# Patient Record
Sex: MALE | Race: WHITE | NOT HISPANIC OR LATINO | URBAN - METROPOLITAN AREA
[De-identification: names, ages, dates, MRNs, and addresses within clinical notes are randomized per-mention and may not be internally consistent; named-entity substitution may affect disease eponyms.]

---

## 2024-01-26 ENCOUNTER — OFFICE VISIT (OUTPATIENT)
Dept: HEPATOLOGY | Facility: CLINIC | Age: 49
End: 2024-01-26
Payer: COMMERCIAL

## 2024-01-26 VITALS — BODY MASS INDEX: 32.62 KG/M2 | HEIGHT: 71 IN | WEIGHT: 233 LBS

## 2024-01-26 DIAGNOSIS — I10 ESSENTIAL HYPERTENSION: ICD-10-CM

## 2024-01-26 DIAGNOSIS — E66.09 CLASS 1 OBESITY DUE TO EXCESS CALORIES WITH SERIOUS COMORBIDITY AND BODY MASS INDEX (BMI) OF 32.0 TO 32.9 IN ADULT: ICD-10-CM

## 2024-01-26 DIAGNOSIS — L29.9 ITCHING: ICD-10-CM

## 2024-01-26 DIAGNOSIS — E78.2 MIXED HYPERLIPIDEMIA: ICD-10-CM

## 2024-01-26 DIAGNOSIS — K76.0 FATTY LIVER: Primary | ICD-10-CM

## 2024-01-26 DIAGNOSIS — E11.9 TYPE 2 DIABETES MELLITUS WITHOUT COMPLICATION, WITHOUT LONG-TERM CURRENT USE OF INSULIN: ICD-10-CM

## 2024-01-26 PROBLEM — M21.6X9 ACQUIRED CAVUS DEFORMITY OF FOOT: Status: ACTIVE | Noted: 2024-01-26

## 2024-01-26 PROBLEM — M25.569 PAIN IN JOINT, LOWER LEG: Status: ACTIVE | Noted: 2024-01-26

## 2024-01-26 PROBLEM — N20.0 RECURRENT NEPHROLITHIASIS: Status: ACTIVE | Noted: 2022-03-24

## 2024-01-26 PROCEDURE — 3008F BODY MASS INDEX DOCD: CPT | Mod: CPTII,95,, | Performed by: NURSE PRACTITIONER

## 2024-01-26 PROCEDURE — 1160F RVW MEDS BY RX/DR IN RCRD: CPT | Mod: CPTII,95,, | Performed by: NURSE PRACTITIONER

## 2024-01-26 PROCEDURE — 99203 OFFICE O/P NEW LOW 30 MIN: CPT | Mod: 95,,, | Performed by: NURSE PRACTITIONER

## 2024-01-26 PROCEDURE — 1159F MED LIST DOCD IN RCRD: CPT | Mod: CPTII,95,, | Performed by: NURSE PRACTITIONER

## 2024-01-26 NOTE — PROGRESS NOTES
"The patient location is: [place]  The chief complaint leading to consultation is: see below    Visit type: audiovisual    Face to Face time with patient: 30 minutes of total time spent on the encounter, which includes face to face time and non-face to face time preparing to see the patient (eg, review of tests), Obtaining and/or reviewing separately obtained history, Documenting clinical information in the electronic or other health record, Independently interpreting results (not separately reported) and communicating results to the patient/family/caregiver, or Care coordination (not separately reported).       Each patient to whom he or she provides medical services by telemedicine is:  (1) informed of the relationship between the physician and patient and the respective role of any other health care provider with respect to management of the patient; and (2) notified that he or she may decline to receive medical services by telemedicine and may withdraw from such care at any time.    Notes:    [facility] VISIT NEW PT NOTE    PCP: No, Primary Doctor     CHIEF COMPLAINT: fatty liver, elevated liver enzymes, itching     HPI: This is a 48 y.o. male with PMH noted below, presenting for evaluation of above    Previous serologic w/u - will obtain     Prior serologic workup:   No results found for: "SMOOTHMUSCAB", "AMAIFA", "IGGSERUM", "ANASCREEN", "FERRITIN", "FESATURATED", "PETH", "[id number]", "CERULOPLSM", "HEPBSAG", "HEPCAB", "HEPAIGM"      Risk factors for fatty liver include obesity, HTN, HLD, DM    Liver fibrosis staging:  -- fibroscan before RTC    Interval HPI: Presents today alone before video visit. On Ozempic for diabetes. Highest wt   249 lbs, currently 233 lbs, goal wt 205 lbs   Does having itching that is unclear the cause, is worried it could be r/t fatty liver     Labs done 9/2023 (outside labs) show mildly elevated/near normal transaminase levels (intermittently elevated in the past)    No " "results found for: "ALT", "AST", "ALKPHOS", "BILITOT", "ALBUMIN", "INR", "PLT"    Abd CT done 2022 showed fatty liver. No splenomegaly - thinks he may have had an US with an outside GI in the past year but unsure of provider's name     Denies family history of liver disease . Denies Alcohol consumption, see below  Social History     Substance and Sexual Activity   Alcohol Use Not Currently       Immunity to Hep A and B - will check with next labs          Allergy and medication list reviewed and updated     PMHX:  has no past medical history on file.    PSHX:  has no past surgical history on file.    FAMILY HISTORY: Updated and reviewed in EPIC    ROS:   GENERAL: Denies fatigue  CARDIOVASCULAR: Denies edema  GI: Denies abdominal pain  SKIN: Denies rash, + itching   NEURO: Denies confusion, memory loss, or mood changes    PHYSICAL EXAM:   Friendly White male, in no acute distress; alert and oriented to person, place and time  VITALS: Ht 5' 11" (1.803 m)   Wt 105.7 kg (233 lb)   BMI 32.50 kg/m²   EYES: Sclerae anicteric  GI: Soft, non-tender, non-distended. No ascites.  EXTREMITIES:  No edema.  SKIN: Warm and dry. No jaundice. No telangectasias noted. No palmar erythema.  NEURO:  No asterixis.  PSYCH:  Thought and speech pattern appropriate. Behavior normal      EDUCATION:  See instructions discussed with patient in Instructions section of the After Visit Summary     ASSESSMENT & PLAN:  48 y.o. male with:  1.  Fatty liver, likely related to metabolic risk factors   - see HPI  -- Immunity to Hep A and B : see HPI  -- Fibroscan with RTC  -- Recommendations discussed with patient:  Limit alcohol consumption  2 Weight loss goal of 30 lbs  3. Low carb/sugar, high fiber and protein diet, limit your carb intake to LESS than 30-45 grams of carbs with a meal or LESS than 5-10 grams with any snack   4. Exercise, 5 days per week, 30 minutes per day, as tolerated  5. Recommend good cholesterol, blood pressure, blood sugar " levels     2. Itching  -- unlikely to be due to fatty liver unless has advanced fibrosis or elevated alk phos or bili, will do labs with RTC  -- AMA to test for PBC but previous alk phos WNL    3. Elevated liver enzymes in the past  -- labs before return to clinic   Orders Placed This Encounter   Procedures    FibroScan [place] (Vibration Controlled Transient Elastography)    Hepatitis Panel, Acute    Hepatic Function Panel    Alpha-1-Antitrypsin    Antimitochondrial Antibody    Ceruloplasmin    Iron and TIBC    IgM    Hepatitis B Surface Ab, Qualitative    Hepatitis A antibody, IgG    Hepatitis B Core Antibody, Total        4. Obesity, HTN, HLD, DM   -- Body mass index is 32.5 kg/m².   -- increases risk for fatty liver             Follow up in about 2 months (around 3/26/2024). with lab and fibroscan before  Orders Placed This Encounter   Procedures    FibroScan [place] (Vibration Controlled Transient Elastography)    Hepatitis Panel, Acute    Hepatic Function Panel    Alpha-1-Antitrypsin    Antimitochondrial Antibody    Ceruloplasmin    Iron and TIBC    IgM    Hepatitis B Surface Ab, Qualitative    Hepatitis A antibody, IgG    Hepatitis B Core Antibody, Total        Thank you for allowing me to participate in the care of DALY Sullivan    I spent a total of 30 minutes on the day of the visit.This includes face to face time and non-face to face time preparing to see the patient (eg, review of tests), obtaining and/or reviewing separately obtained history, documenting clinical information in the electronic or other health record, independently interpreting results and communicating results to the patient/family/caregiver, and coordinating care.         CC'ed note to:

## 2024-01-26 NOTE — PATIENT INSTRUCTIONS
1. Fibroscan to look for fat or scar tissue in the liver with return to clinic   2. Will check immunity markers for Hepatitis A and B and arrange for vaccination if needed  3. Labs before return to clinic to  check for multiple causes of liver disease. These labs will release to you as soon as they are resulted but we will discuss them in detail at your upcoming visit to discuss what the lab results mean.   4.  Follow up in 2 months after labs and fibroscan    There is no FDA approved therapy for fatty liver disease. Therefore, these things are important:  Limit alcohol consumption  Weight loss goal of 30 lbs. [facility] offers benefits to patients so let me know if you want a referral to the [facility] gym. Also, [facility] has dieticians that can create a weight loss plan. If you are interested let me know and I can place a referral. If so, contact the dietician team at [facility] at email [email or web address] or call [phone] to get scheduled. They do offer video visits   Low carb/sugar and high protein diet (~1 g/kg/day of protein).Try to limit your carb intake to LESS than 30-45 grams of carbs with a meal or LESS than 5-10 grams with any snack (total of any snack foods eaten during that time). Use [employer] Pal or Lose It raquel to add up your carbs through the day. Do NOT drink any beverages with calories or carbs (this can lead to high blood sugar and weight gain). The main thing to focus on is low calorie, high protein, low carb)  Exercise, 5 days per week, 30 minutes per day, as tolerated  Recommend good cholesterol, blood pressure, blood sugar levels     In some people, fatty liver can progress to steatohepatitis (inflamatory fatty liver) and possibly to cirrhosis, putting one at increased risk for liver cancer, liver failure, and death. Therefore, the lifestyle changes are very important to decrease this risk.

## 2024-03-28 ENCOUNTER — LAB VISIT (OUTPATIENT)
Dept: LAB | Facility: HOSPITAL | Age: 49
End: 2024-03-28
Payer: COMMERCIAL

## 2024-03-28 ENCOUNTER — PROCEDURE VISIT (OUTPATIENT)
Dept: HEPATOLOGY | Facility: CLINIC | Age: 49
End: 2024-03-28
Payer: COMMERCIAL

## 2024-03-28 DIAGNOSIS — L29.9 ITCHING: ICD-10-CM

## 2024-03-28 DIAGNOSIS — K76.0 FATTY LIVER: ICD-10-CM

## 2024-03-28 LAB
A1AT SERPL-MCNC: 165 MG/DL (ref 100–190)
ALBUMIN SERPL BCP-MCNC: 4.1 G/DL (ref 3.5–5.2)
ALP SERPL-CCNC: 87 U/L (ref 55–135)
ALT SERPL W/O P-5'-P-CCNC: 36 U/L (ref 10–44)
AST SERPL-CCNC: 28 U/L (ref 10–40)
BILIRUB DIRECT SERPL-MCNC: 0.3 MG/DL (ref 0.1–0.3)
BILIRUB SERPL-MCNC: 0.8 MG/DL (ref 0.1–1)
CERULOPLASMIN SERPL-MCNC: 28 MG/DL (ref 15–45)
HAV IGG SER QL IA: REACTIVE
HAV IGM SERPL QL IA: NORMAL
HBV CORE AB SERPL QL IA: NORMAL
HBV CORE IGM SERPL QL IA: NORMAL
HBV SURFACE AB SER-ACNC: <3 MIU/ML
HBV SURFACE AB SER-ACNC: NORMAL M[IU]/ML
HBV SURFACE AG SERPL QL IA: NORMAL
HCV AB SERPL QL IA: NORMAL
IGM SERPL-MCNC: 93 MG/DL (ref 50–300)
IRON SERPL-MCNC: 111 UG/DL (ref 45–160)
PROT SERPL-MCNC: 7 G/DL (ref 6–8.4)
SATURATED IRON: 27 % (ref 20–50)
TOTAL IRON BINDING CAPACITY: 407 UG/DL (ref 250–450)
TRANSFERRIN SERPL-MCNC: 275 MG/DL (ref 200–375)

## 2024-03-28 PROCEDURE — 86704 HEP B CORE ANTIBODY TOTAL: CPT | Performed by: NURSE PRACTITIONER

## 2024-03-28 PROCEDURE — 82784 ASSAY IGA/IGD/IGG/IGM EACH: CPT | Performed by: NURSE PRACTITIONER

## 2024-03-28 PROCEDURE — 86381 MITOCHONDRIAL ANTIBODY EACH: CPT | Performed by: NURSE PRACTITIONER

## 2024-03-28 PROCEDURE — 82103 ALPHA-1-ANTITRYPSIN TOTAL: CPT | Performed by: NURSE PRACTITIONER

## 2024-03-28 PROCEDURE — 83540 ASSAY OF IRON: CPT | Performed by: NURSE PRACTITIONER

## 2024-03-28 PROCEDURE — 36415 COLL VENOUS BLD VENIPUNCTURE: CPT | Performed by: NURSE PRACTITIONER

## 2024-03-28 PROCEDURE — 82390 ASSAY OF CERULOPLASMIN: CPT | Performed by: NURSE PRACTITIONER

## 2024-03-28 PROCEDURE — 80076 HEPATIC FUNCTION PANEL: CPT | Performed by: NURSE PRACTITIONER

## 2024-03-28 PROCEDURE — 91200 LIVER ELASTOGRAPHY: CPT | Mod: S$GLB,,, | Performed by: NURSE PRACTITIONER

## 2024-03-28 PROCEDURE — 86790 VIRUS ANTIBODY NOS: CPT | Performed by: NURSE PRACTITIONER

## 2024-03-28 PROCEDURE — 80074 ACUTE HEPATITIS PANEL: CPT | Performed by: NURSE PRACTITIONER

## 2024-03-28 PROCEDURE — 86706 HEP B SURFACE ANTIBODY: CPT | Mod: 91 | Performed by: NURSE PRACTITIONER

## 2024-03-28 NOTE — PROCEDURES
FibroScan [place] (Vibration Controlled Transient Elastography)    Date/Time: 3/28/2024 9:15 AM    Performed by: Vonda Acosta NP  Authorized by: Vonda Acosta NP    Probe:  XL    Universal Protocol: Patient's identity, procedure and site were verified, confirmatory pause was performed.  Discussed procedure including risks and potential complications.  Questions answered.  Patient verbalizes understanding and wishes to proceed with VCTE.     Procedure: After providing explanations of the procedure, patient was placed in the supine position with right arm in maximum abduction to allow optimal exposure of right lateral abdomen.  Patient was briefly assessed, Testing was performed in the mid-axillary location, 50Hz Shear Wave pulses were applied and the resulting Shear Wave and Propagation Speed detected with a 3.5 MHz ultrasonic signal, using the FibroScan probe, Skin to liver capsule distance and liver parenchyma were accessed during the entire examination with the FibroScan probe, Patient was instructed to breathe normally and to abstain from sudden movements during the procedure, allowing for random measurements of liver stiffness. At least 10 Shear Waves were produced, Individual measurements of each Shear Wave were calculated.  Patient tolerated the procedure well with no complications.  Meets discharge criteria as was dismissed.  Rates pain 0 out of 10.  Patient will follow up with ordering provider to review results.    Findings  Median liver stiffness score:  4.9  CAP Reading: dB/m:  226    IQR/med %:  14  Interpretation  Fibrosis interpretation is based on medial liver stiffness - Kilopascal (kPa).    Fibrosis Stage:  F 0-1  Steatosis interpretation is based on controlled attenuation parameter - (dB/m).    Steatosis Grade:  <S1

## 2024-04-01 LAB — MITOCHONDRIA AB TITR SER IF: NORMAL {TITER}

## 2024-04-05 ENCOUNTER — PATIENT MESSAGE (OUTPATIENT)
Dept: HEPATOLOGY | Facility: CLINIC | Age: 49
End: 2024-04-05

## 2024-04-05 ENCOUNTER — OFFICE VISIT (OUTPATIENT)
Dept: HEPATOLOGY | Facility: CLINIC | Age: 49
End: 2024-04-05
Payer: COMMERCIAL

## 2024-04-05 VITALS — HEIGHT: 71 IN | BODY MASS INDEX: 29.96 KG/M2 | WEIGHT: 214 LBS

## 2024-04-05 DIAGNOSIS — Z23 NEED FOR HEPATITIS B VACCINATION: ICD-10-CM

## 2024-04-05 DIAGNOSIS — Z87.19 HISTORY OF FATTY INFILTRATION OF LIVER: Primary | ICD-10-CM

## 2024-04-05 PROBLEM — K76.0 FATTY LIVER: Status: RESOLVED | Noted: 2024-01-26 | Resolved: 2024-04-05

## 2024-04-05 PROBLEM — E66.09 CLASS 1 OBESITY DUE TO EXCESS CALORIES WITH SERIOUS COMORBIDITY AND BODY MASS INDEX (BMI) OF 32.0 TO 32.9 IN ADULT: Status: RESOLVED | Noted: 2024-01-26 | Resolved: 2024-04-05

## 2024-04-05 PROCEDURE — 1159F MED LIST DOCD IN RCRD: CPT | Mod: CPTII,95,, | Performed by: NURSE PRACTITIONER

## 2024-04-05 PROCEDURE — 99214 OFFICE O/P EST MOD 30 MIN: CPT | Mod: 95,,, | Performed by: NURSE PRACTITIONER

## 2024-04-05 PROCEDURE — 1160F RVW MEDS BY RX/DR IN RCRD: CPT | Mod: CPTII,95,, | Performed by: NURSE PRACTITIONER

## 2024-04-05 PROCEDURE — 3008F BODY MASS INDEX DOCD: CPT | Mod: CPTII,95,, | Performed by: NURSE PRACTITIONER

## 2024-04-05 RX ORDER — ATORVASTATIN CALCIUM 80 MG/1
80 TABLET, FILM COATED ORAL DAILY
COMMUNITY

## 2024-04-05 RX ORDER — FUROSEMIDE 20 MG/1
20 TABLET ORAL DAILY
COMMUNITY

## 2024-04-05 RX ORDER — AZELASTINE 1 MG/ML
1 SPRAY, METERED NASAL 2 TIMES DAILY
COMMUNITY
Start: 2024-03-18 | End: 2025-03-18

## 2024-04-05 RX ORDER — HYDRALAZINE HYDROCHLORIDE 100 MG/1
1 TABLET, FILM COATED ORAL 3 TIMES DAILY
COMMUNITY
Start: 2023-09-22

## 2024-04-05 RX ORDER — FLUOXETINE HYDROCHLORIDE 40 MG/1
1 CAPSULE ORAL DAILY
COMMUNITY
Start: 2024-01-09

## 2024-04-05 RX ORDER — EPINEPHRINE 0.3 MG/.3ML
0.3 INJECTION SUBCUTANEOUS ONCE
COMMUNITY
Start: 2024-03-18

## 2024-04-05 RX ORDER — CARVEDILOL 25 MG/1
25 TABLET ORAL 2 TIMES DAILY
COMMUNITY

## 2024-04-05 RX ORDER — CETIRIZINE HYDROCHLORIDE 10 MG/1
1 TABLET ORAL DAILY
COMMUNITY
Start: 2024-03-18 | End: 2025-03-18

## 2024-04-05 RX ORDER — HEPATITIS B VACCINE (RECOMBINANT) ADJUVANTED 20 UG/.5ML
0.5 INJECTION, SOLUTION INTRAMUSCULAR ONCE
Qty: 0.5 ML | Refills: 1 | Status: SHIPPED | OUTPATIENT
Start: 2024-04-05 | End: 2024-04-06

## 2024-04-05 NOTE — LETTER
April 5, 2024    Garrett Gonzales  [address] [place] [address]             Hepatology Clinic - [facility]  [address]  [place] [address]  Phone: [phone] To Whom it May Concern,    Mr. Gonzales has been evaluated by [facility] for a history of fatty liver. With significant lifestyle and diet changes, he has been successful with significant weight loss and has reversed his fatty liver. On his recent fibroscan, he no longer has fatty liver.       If you have any questions or concerns, please don't hesitate to call.    Sincerely,          Vonda Acosta, NP

## 2024-04-05 NOTE — PATIENT INSTRUCTIONS
1. Fibroscan to look for fat or scar tissue in the liver showed no significant fatty liver or fibrosis - all resolved with weight loss   2. Recommend vaccines for Hepatitis  B, see below   3. Follow up in 2 years for repeat fibroscan if you wish    These things are important to prevent fatty liver:  Limit alcohol consumption  2  Maintain weight loss   3. Low carb/sugar and high protein diet (~1 g/kg/day of protein).Try to limit your carb intake to LESS than 30-45 grams of carbs with a meal or LESS than 5-10 grams with any snack (total of any snack foods eaten during that time). Use MyFitness Pal or Lose It raquel to add up your carbs through the day. Do NOT drink any beverages with calories or carbs (this can lead to high blood sugar and weight gain). The main thing to focus on is low calorie, high protein, low carb)  4. Exercise, 5 days per week, 30 minutes per day, as tolerated  5. Recommend good cholesterol, blood pressure, blood sugar levels       In some people, fatty liver can progress to steatohepatitis (inflamatory fatty liver) and possibly to cirrhosis, increasing the risk for liver cancer, liver failure, and death. Therefore, the lifestyle changes are very important to decrease this risk.       HEP B VACCINE  Your labs show that you DO have immunity against Hep A (+ Hep A IgG), so no further vaccine needed for Hep A    Your immunity markers show that you do NOT have immunity against Hepatitis B, so I recommend that you receive the Hepatitis B vaccine. This will protect your liver from the virus, which can make your liver very sick. The vaccine series is either 2 or 3 vaccines (whichever your insurance covers), either:  1. One now, one at 4 weeks (completed after this)  OR  2.  One now, one at 4 weeks and one 6 months after the 1st one.    I sent the vaccine to the [facility]. I recommend calling them in a couple of days to see if the vaccine is covered by your insurance and arrange the vaccines  if they are covered. Their number is [phone]       If the vaccine is not covered at the pharmacy level, I sent an order that you can get the vaccine in the infectious disease department at [facility]. If that is the case, please call [phone]  to schedule your vaccine administration appointment in the infectious disease department.  If you need to proceed with vaccines with the infectious disease department, you can call to obtain a cost for these vaccines before you proceed, you can call the [facility] office at [phone] or [phone]

## 2024-04-05 NOTE — PROGRESS NOTES
The patient location is: [place]  The chief complaint leading to consultation is: see below    Visit type: audiovisual    Face to Face time with patient: 30 minutes of total time spent on the encounter, which includes face to face time and non-face to face time preparing to see the patient (eg, review of tests), Obtaining and/or reviewing separately obtained history, Documenting clinical information in the electronic or other health record, Independently interpreting results (not separately reported) and communicating results to the patient/family/caregiver, or Care coordination (not separately reported).       Each patient to whom he or she provides medical services by telemedicine is:  (1) informed of the relationship between the physician and patient and the respective role of any other health care provider with respect to management of the patient; and (2) notified that he or she may decline to receive medical services by telemedicine and may withdraw from such care at any time.    Notes:    [facility] VISIT FOLLOW UP  NOTE    PCP: No, Primary Doctor     CHIEF COMPLAINT: fatty liver, elevated liver enzymes    HPI: This is a 48 y.o. male with PMH noted below, presenting for follow up of above    Serological workup was negative for Suraj's, alpha-1 antitrypsin deficiency, hemochromatosis, and viral hepatitis      Prior serologic workup:   Lab Results   Component Value Date    AMAIFA Negative 1:40 03/28/2024    FESATURATED 27 03/28/2024    CERULOPLSM 28.0 03/28/2024    HEPBSAG Non-reactive 03/28/2024    HEPCAB Non-reactive 03/28/2024    HEPAIGM Non-reactive 03/28/2024         Risk factors for fatty liver include obesity, HTN, HLD, DM    Liver fibrosis staging:  -- Fibroscan 3/2024 noted F0, S0 (kPA 4.9, )    Interval HPI: Presents today alone before video visit. On Ozempic 2 mg for diabetes. Highest wt   249 lbs, currently 214 lbs, goal wt 205 lbs   Does having itching that is unclear the cause,  "is worried it could be r/t fatty liver     Labs done 9/2023 (outside labs) show mildly elevated/near normal transaminase levels (intermittently elevated in the past)    Lab Results   Component Value Date    ALT 36 03/28/2024    AST 28 03/28/2024    ALKPHOS 87 03/28/2024    BILITOT 0.8 03/28/2024    ALBUMIN 4.1 03/28/2024       Abd CT done 2022 showed fatty liver. No splenomegaly - thinks he may have had an US with an outside GI in the past year but unsure of provider's name     Denies family history of liver disease . Denies Alcohol consumption, see below  Social History     Substance and Sexual Activity   Alcohol Use Not Currently       +Immunity to Hep A - needs Hep B, [facility]          Allergy and medication list reviewed and updated     PMHX:  has no past medical history on file.    PSHX:  has no past surgical history on file.    FAMILY HISTORY: Updated and reviewed in EPIC    ROS:   GENERAL: Denies fatigue  CARDIOVASCULAR: Denies edema  GI: Denies abdominal pain  SKIN: Denies rash, + itching   NEURO: Denies confusion, memory loss, or mood changes    PHYSICAL EXAM:   Friendly White male, in no acute distress; alert and oriented to person, place and time  VITALS: Ht 5' 11" (1.803 m)   Wt 97.1 kg (214 lb)   BMI 29.85 kg/m²   EYES: Sclerae anicteric  GI: Soft, non-tender, non-distended. No ascites.  EXTREMITIES:  No edema.  SKIN: Warm and dry. No jaundice. No telangectasias noted. No palmar erythema.  NEURO:  No asterixis.  PSYCH:  Thought and speech pattern appropriate. Behavior normal      EDUCATION:  See instructions discussed with patient in Instructions section of the After Visit Summary     ASSESSMENT & PLAN:  48 y.o. male with:  1.  H/o Fatty liver  Resolved with weight loss/ lifestyle changes, no fatty liver on fibroscan  - see HPI  -- Immunity to Hep A and B : see HPI  -- Recommendations discussed with patient:  Limit alcohol consumption  2 Continue weight loss   3. Low carb/sugar, high " fiber and protein diet, limit your carb intake to LESS than 30-45 grams of carbs with a meal or LESS than 5-10 grams with any snack   4. Exercise, 5 days per week, 30 minutes per day, as tolerated  5. Recommend good cholesterol, blood pressure, blood sugar levels     2. Itching  -- AMA Negative, labs normal, do not suspect r/t liver disease     3. HTN, HLD, DM   -- Body mass index is 29.85 kg/m².   -- increases risk for fatty liver             Follow up in about 2 years (around 4/5/2026). Repeat fibroscan and uS to recheck if you wish   Orders Placed This Encounter   Procedures    FibroScan Transplant Hepatology(Vibration Controlled Transient Elastography)    US Abdomen Complete    Hepatitis B (Recombinant) Adjuvanted, 2 dose        Thank you for allowing me to participate in the care of DALY Sullivan    I spent a total of 30 minutes on the day of the visit.This includes face to face time and non-face to face time preparing to see the patient (eg, review of tests), obtaining and/or reviewing separately obtained history, documenting clinical information in the electronic or other health record, independently interpreting results and communicating results to the patient/family/caregiver, and coordinating care.         CC'ed note to: